# Patient Record
Sex: MALE | Race: WHITE
[De-identification: names, ages, dates, MRNs, and addresses within clinical notes are randomized per-mention and may not be internally consistent; named-entity substitution may affect disease eponyms.]

---

## 2020-11-14 ENCOUNTER — HOSPITAL ENCOUNTER (OUTPATIENT)
Dept: HOSPITAL 96 - M.ERS | Age: 40
Setting detail: OBSERVATION
LOS: 1 days | Discharge: HOME | End: 2020-11-15
Attending: INTERNAL MEDICINE | Admitting: INTERNAL MEDICINE
Payer: COMMERCIAL

## 2020-11-14 VITALS — HEIGHT: 72.99 IN | WEIGHT: 216 LBS | BODY MASS INDEX: 28.63 KG/M2

## 2020-11-14 VITALS — DIASTOLIC BLOOD PRESSURE: 85 MMHG | SYSTOLIC BLOOD PRESSURE: 124 MMHG

## 2020-11-14 DIAGNOSIS — Z20.828: ICD-10-CM

## 2020-11-14 DIAGNOSIS — R65.10: ICD-10-CM

## 2020-11-14 DIAGNOSIS — D72.829: ICD-10-CM

## 2020-11-14 DIAGNOSIS — M54.2: Primary | ICD-10-CM

## 2020-11-14 DIAGNOSIS — Z79.899: ICD-10-CM

## 2020-11-14 DIAGNOSIS — F17.290: ICD-10-CM

## 2020-11-14 LAB
ABSOLUTE BASOPHILS: 0.1 THOU/UL (ref 0–0.2)
ABSOLUTE EOSINOPHILS: 0.4 THOU/UL (ref 0–0.7)
ABSOLUTE MONOCYTES: 1.1 THOU/UL (ref 0–1.2)
ALBUMIN SERPL-MCNC: 3.7 G/DL (ref 3.4–5)
ALP SERPL-CCNC: 56 U/L (ref 46–116)
ALT SERPL-CCNC: 24 U/L (ref 30–65)
ANION GAP SERPL CALC-SCNC: 6 MMOL/L (ref 7–16)
AST SERPL-CCNC: 11 U/L (ref 15–37)
BASOPHILS NFR BLD AUTO: 0.9 %
BILIRUB SERPL-MCNC: 0.2 MG/DL
BUN SERPL-MCNC: 13 MG/DL (ref 7–18)
CALCIUM SERPL-MCNC: 8.5 MG/DL (ref 8.5–10.1)
CHLORIDE SERPL-SCNC: 105 MMOL/L (ref 98–107)
CO2 SERPL-SCNC: 30 MMOL/L (ref 21–32)
CREAT SERPL-MCNC: 0.9 MG/DL (ref 0.6–1.3)
EOSINOPHIL NFR BLD: 2.7 %
GLUCOSE SERPL-MCNC: 99 MG/DL (ref 70–99)
GRANULOCYTES NFR BLD MANUAL: 62.7 %
HCT VFR BLD CALC: 45 % (ref 42–52)
HGB BLD-MCNC: 15 GM/DL (ref 14–18)
LYMPHOCYTES # BLD: 3.5 THOU/UL (ref 0.8–5.3)
LYMPHOCYTES NFR BLD AUTO: 25.5 %
MCH RBC QN AUTO: 30.4 PG (ref 26–34)
MCHC RBC AUTO-ENTMCNC: 33.4 G/DL (ref 28–37)
MCV RBC: 91.1 FL (ref 80–100)
MONOCYTES NFR BLD: 8.2 %
MPV: 7.4 FL. (ref 7.2–11.1)
NEUTROPHILS # BLD: 8.6 THOU/UL (ref 1.6–8.1)
NUCLEATED RBCS: 0 /100WBC
PLATELET COUNT*: 368 THOU/UL (ref 150–400)
POTASSIUM SERPL-SCNC: 4.6 MMOL/L (ref 3.5–5.1)
PROT SERPL-MCNC: 7.1 G/DL (ref 6.4–8.2)
RBC # BLD AUTO: 4.95 MIL/UL (ref 4.5–6)
RDW-CV: 13.6 % (ref 10.5–14.5)
SODIUM SERPL-SCNC: 141 MMOL/L (ref 136–145)
WBC # BLD AUTO: 13.7 THOU/UL (ref 4–11)

## 2020-11-15 VITALS — DIASTOLIC BLOOD PRESSURE: 54 MMHG | SYSTOLIC BLOOD PRESSURE: 123 MMHG

## 2020-11-15 VITALS — DIASTOLIC BLOOD PRESSURE: 54 MMHG | SYSTOLIC BLOOD PRESSURE: 119 MMHG

## 2020-11-15 VITALS — SYSTOLIC BLOOD PRESSURE: 119 MMHG | DIASTOLIC BLOOD PRESSURE: 54 MMHG

## 2020-11-15 VITALS — SYSTOLIC BLOOD PRESSURE: 119 MMHG | DIASTOLIC BLOOD PRESSURE: 63 MMHG

## 2020-11-15 LAB
CLARITY CSF: CLEAR
COLOR CSF: COLORLESS
CSF RBC: 3 /MM3
CSF WBC: 1 /MM3 (ref 0–10)
GLUCOSE CSF-MCNC: 70 MG/DL (ref 40–70)
PROT CSF-MCNC: 25.6 MG/DL (ref 15–45)
SPECIMEN VOL 24H UR: 3.5 ML

## 2020-11-15 NOTE — NUR
ASSUMED CARE OF PATIENT THIS AM AT 0730.  PATIENT IS ALERT AND ORIENTED X 4.
HE C/O NECK PAIN AND DECREASED ABILITY TO TURN HIS HEAD.  PATIENT MEDICATED
FOR C/O PAIN.  DR RUBIO IN TO ROUND AND ORDERS WRITTEN.  PATIENT GIVEN
MEDCIATIONS AS ORDERED.  LIDOCAINE PATCH APPLIED.  ORDERS WRITTEN TO DISCHARGE
PATIENT TO HOME.

## 2020-11-15 NOTE — NUR
PT ADMIT TO ROOM 214 AT 0200, ALERT ORIENTED PT WITH PAIN IN NECK AND HEAD.
MORPHINE GIVEN FOR PAIN. MED/SURG STATUS. ON RA. ABX GIVEN PRIMARY IVF NS AT
100MLS/HR. RESTING QUIETLY IN ROOM

## 2020-11-19 ENCOUNTER — HOSPITAL ENCOUNTER (EMERGENCY)
Dept: HOSPITAL 96 - M.ERS | Age: 40
LOS: 1 days | Discharge: HOME | End: 2020-11-20
Payer: COMMERCIAL

## 2020-11-19 VITALS — WEIGHT: 205.01 LBS | HEIGHT: 73 IN | BODY MASS INDEX: 27.17 KG/M2

## 2020-11-19 DIAGNOSIS — Z20.828: ICD-10-CM

## 2020-11-19 DIAGNOSIS — R51.9: Primary | ICD-10-CM

## 2020-11-19 DIAGNOSIS — R11.10: ICD-10-CM

## 2020-11-19 LAB
ABSOLUTE BASOPHILS: 0.1 THOU/UL (ref 0–0.2)
ABSOLUTE EOSINOPHILS: 0.2 THOU/UL (ref 0–0.7)
ABSOLUTE MONOCYTES: 1.1 THOU/UL (ref 0–1.2)
ALBUMIN SERPL-MCNC: 3.7 G/DL (ref 3.4–5)
ALP SERPL-CCNC: 49 U/L (ref 46–116)
ALT SERPL-CCNC: 18 U/L (ref 30–65)
ANION GAP SERPL CALC-SCNC: 8 MMOL/L (ref 7–16)
AST SERPL-CCNC: 9 U/L (ref 15–37)
BASOPHILS NFR BLD AUTO: 0.6 %
BILIRUB SERPL-MCNC: 0.3 MG/DL
BUN SERPL-MCNC: 12 MG/DL (ref 7–18)
CALCIUM SERPL-MCNC: 8.4 MG/DL (ref 8.5–10.1)
CHLORIDE SERPL-SCNC: 105 MMOL/L (ref 98–107)
CO2 SERPL-SCNC: 28 MMOL/L (ref 21–32)
CREAT SERPL-MCNC: 0.9 MG/DL (ref 0.6–1.3)
EOSINOPHIL NFR BLD: 1.3 %
GLUCOSE SERPL-MCNC: 98 MG/DL (ref 70–99)
GRANULOCYTES NFR BLD MANUAL: 71.4 %
HCT VFR BLD CALC: 46.3 % (ref 42–52)
HGB BLD-MCNC: 15.7 GM/DL (ref 14–18)
LYMPHOCYTES # BLD: 3 THOU/UL (ref 0.8–5.3)
LYMPHOCYTES NFR BLD AUTO: 19.6 %
MCH RBC QN AUTO: 30.7 PG (ref 26–34)
MCHC RBC AUTO-ENTMCNC: 34 G/DL (ref 28–37)
MCV RBC: 90.3 FL (ref 80–100)
MONOCYTES NFR BLD: 7.1 %
MPV: 7.4 FL. (ref 7.2–11.1)
NEUTROPHILS # BLD: 10.8 THOU/UL (ref 1.6–8.1)
NUCLEATED RBCS: 0 /100WBC
PLATELET COUNT*: 369 THOU/UL (ref 150–400)
POTASSIUM SERPL-SCNC: 3.8 MMOL/L (ref 3.5–5.1)
PROT SERPL-MCNC: 7.4 G/DL (ref 6.4–8.2)
RBC # BLD AUTO: 5.13 MIL/UL (ref 4.5–6)
RDW-CV: 13.5 % (ref 10.5–14.5)
SODIUM SERPL-SCNC: 141 MMOL/L (ref 136–145)
WBC # BLD AUTO: 15.2 THOU/UL (ref 4–11)

## 2020-11-20 VITALS — DIASTOLIC BLOOD PRESSURE: 70 MMHG | SYSTOLIC BLOOD PRESSURE: 130 MMHG
